# Patient Record
Sex: MALE | Race: WHITE | NOT HISPANIC OR LATINO | Employment: UNEMPLOYED | URBAN - METROPOLITAN AREA
[De-identification: names, ages, dates, MRNs, and addresses within clinical notes are randomized per-mention and may not be internally consistent; named-entity substitution may affect disease eponyms.]

---

## 2017-06-19 ENCOUNTER — TRANSCRIBE ORDERS (OUTPATIENT)
Dept: ADMINISTRATIVE | Facility: HOSPITAL | Age: 52
End: 2017-06-19

## 2017-06-19 DIAGNOSIS — M79.672 LEFT FOOT PAIN: Primary | ICD-10-CM

## 2017-07-05 ENCOUNTER — HOSPITAL ENCOUNTER (OUTPATIENT)
Dept: CT IMAGING | Facility: HOSPITAL | Age: 52
Discharge: HOME | End: 2017-07-05
Attending: ORTHOPAEDIC SURGERY | Admitting: ORTHOPAEDIC SURGERY

## 2017-07-05 DIAGNOSIS — M79.672 LEFT FOOT PAIN: ICD-10-CM

## 2017-07-05 PROCEDURE — 73700 CT LOWER EXTREMITY W/O DYE: CPT

## 2019-01-23 ENCOUNTER — HOSPITAL ENCOUNTER (OUTPATIENT)
Dept: OTHER | Facility: HOSPITAL | Age: 54
Setting detail: RECURRING SERIES
Discharge: HOME | End: 2019-04-30
Attending: FAMILY MEDICINE | Admitting: FAMILY MEDICINE

## 2019-12-09 ENCOUNTER — TREATMENT (OUTPATIENT)
Dept: PHYSICAL THERAPY | Facility: CLINIC | Age: 54
End: 2019-12-09

## 2019-12-09 ENCOUNTER — TRANSCRIBE ORDERS (OUTPATIENT)
Dept: PHYSICAL THERAPY | Facility: CLINIC | Age: 54
End: 2019-12-09

## 2019-12-09 DIAGNOSIS — M54.2 PAIN, NECK: Primary | ICD-10-CM

## 2019-12-09 DIAGNOSIS — M54.2 CERVICALGIA: Primary | ICD-10-CM

## 2019-12-09 PROCEDURE — 97162 PT EVAL MOD COMPLEX 30 MIN: CPT | Performed by: PHYSICAL THERAPIST

## 2019-12-09 NOTE — PROGRESS NOTES
"Physical Therapy Initial Evaluation and Plan of Care    Patient: Sam Johnson   : 1965  Diagnosis/ICD-10 Code:  Pain, neck [M54.2]  Referring practitioner: Gail Louis  Date of Initial Visit: 2019  Today's Date: 2019  Patient seen for 1 sessions           Subjective Questionnaire: NDI: 17       Subjective Evaluation    History of Present Illness  Mechanism of injury: Pnt reports pain in the left aspect of his neck x's 3-4 months.  He states the pain is slightly worse regarding intensity and frequency.  He has not any test for the neck.  PT referral from PCP.  He is not taking any different meds for the neck.  He denies any visual, headaches or auditory changes due to the pain.  He does reports some episodes of dizziness over the last month; \"think it's my sugar level\".    No change with time of the day.  Pain increases with quick neck motions, certain certain neck motions and yue when trying to drive.  Denies any UE N/T or pain.    SLEEP:  BSB, no change of pain.  PAST MEDICAL HISTORY:  DM, cateracts,    1ST AM:  stiff    Quality of life: good    Pain  Current pain ratin  At best pain rating: 3  At worst pain ratin  Quality: tight, throbbing and pressure  Alleviating factors: sit still and try not to move the neck.  Aggravating factors: lifting, movement and overhead activity  Progression: worsening    Hand dominance: right    Treatments  Previous treatment: medication  Patient Goals  Patient goal: less pain, turn neck and be comfortable.            Objective       Palpation   Left   Tenderness of the levator scapulae, suboccipitals and upper trapezius.   Trigger point to upper trapezius.     Right Tenderness of the suboccipitals and upper trapezius.     Active Range of Motion   Cervical/Thoracic Spine   Cervical    Flexion: 31 degrees   Extension: 40 degrees with pain  Left lateral flexion: 23 degrees with pain  Right lateral flexion: 30 degrees with pain  Left rotation: 67 " degrees with pain  Right rotation: 54 degrees with pain    Additional Active Range of Motion Details  Greater pain with ext vs flexion.  Ipsilateral pain with sidebending and rotation     Passive Range of Motion     Additional Passive Range of Motion Details  terrell PIVM C3-C71-  T1-T10 PA = 1-     Strength/Myotome Testing   Cervical Spine     Left   Normal strength    Right   Normal strength    Left Wrist/Hand      (2nd hand position)     Trial 1: 81 lbs    Thumb Strength  Key/Lateral Pinch     Trial 1: 22 lbs  Tip/Two-Point Pinch     Trial 1: 18 lbs  Palmar/Three-Point Pinch     Trial 1: 20 lbs    Right Wrist/Hand      (2nd hand position)     Trial 1: 87 lbs    Thumb Strength   Key/Lateral Pinch     Trial 1: 23 lbs  Tip/Two-Point Pinch     Trial 1: 20 lbs  Palmar/Three-Point Pinch     Trial 1: 22 lbs    Tests   Cervical     Left   Positive active compression ([place]) and Spurling's sign.   Negative Adson maneuver, alar ligament integrity, ULTT1, ULTT2, ULTT3 and ULTT4.          Assessment & Plan     Assessment  Impairments: abnormal or restricted ROM, activity intolerance, lacks appropriate home exercise program and pain with function  Assessment details: Mr. Johnson is a 54 yr old male referred to PT due to ongoing cervical pain.  He was seen in the clinic today due for the initial evaluation.  He presents with evidence of DDD cervical spine with derangement and dysfunction components.  Melquiades would benefit from outpatient PT in order to achieve the stated goals and maximize functional mobility.  The evaluation was of moderate level due to medical history, components assessed and complexity of decision making.   Functional Limitations: carrying objects, lifting and uncomfortable because of pain  Goals  Plan Goals: ST visits      1)  Pain levels 2-6/10      2)  pnt to report a 25% improvement in sleep due to cervical involvement      3)  pnt to demonstrate neutral spine posture when at rest.      4)  pnt  to demonstrate I in current HEP    LTG:   DC      1)  0-3/10      2)  Improvement of cervical motion WNLs and painfree      3)  2-/3 PIVM cervical spine      4)  IHEP      5)  Improve NDI score to < 15% deficit    Plan  Therapy options: will be seen for skilled physical therapy services  Planned modality interventions: thermotherapy (hydrocollator packs), high voltage pulsed current (pain management), cryotherapy and traction  Planned therapy interventions: therapeutic activities, strengthening, stretching, spinal/joint mobilization, manual therapy, home exercise program, body mechanics training and postural training  Frequency: 2x week  Duration in visits: 20  Duration in weeks: 8  Treatment plan discussed with: patient  Plan details: Prone strengthening,  DNF isometrics, anterior trunk stretches, manual         Timed:         Manual Therapy:    5     mins  55585;     Therapeutic Exercise:    6     mins  64677;     Neuromuscular Sabiha:        mins  96904;    Therapeutic Activity:          mins  54742;     Gait Training:           mins  86420;     Ultrasound:          mins  93033;    Ionto                                   mins   08799  Self Care                            mins   63407  Canalith Repos         mins 30271      Un-Timed:  Electrical Stimulation:         mins  52409 ( );  Dry Needling          mins self-pay  Traction          mins 49315  Low Eval          Mins  62285  Mod Eval     31     Mins  82652  High Eval                            Mins  20532  Re-Eval                               mins  60317        Timed Treatment:   11   mins   Total Treatment:     46   mins    PT SIGNATURE: Norma Alvarez, ROHAN   DATE TREATMENT INITIATED: 12/9/2019    Initial Certification  Certification Period: 3/8/2020  I certify that the therapy services are furnished while this patient is under my care.  The services outlined above are required by this patient, and will be reviewed every 90 days.     PHYSICIAN:  Gail Louis      DATE:     Please sign and return via fax to  .. Thank you, [facility].

## 2019-12-19 ENCOUNTER — TREATMENT (OUTPATIENT)
Dept: PHYSICAL THERAPY | Facility: CLINIC | Age: 54
End: 2019-12-19

## 2019-12-19 DIAGNOSIS — M54.2 PAIN, NECK: Primary | ICD-10-CM

## 2019-12-19 PROCEDURE — 97140 MANUAL THERAPY 1/> REGIONS: CPT | Performed by: PHYSICAL THERAPIST

## 2019-12-19 PROCEDURE — 97110 THERAPEUTIC EXERCISES: CPT | Performed by: PHYSICAL THERAPIST

## 2019-12-19 PROCEDURE — G0283 ELEC STIM OTHER THAN WOUND: HCPCS | Performed by: PHYSICAL THERAPIST

## 2019-12-19 NOTE — PROGRESS NOTES
Physical Therapy Daily Progress Note    VISIT#: 2    Subjective   Sam Johnson reports that the neck is still really stiff.    Objective   LSB with ipsilateral pain. LUE hand numbness during L UT stretch.  Added nerve glides  See Exercise, Manual, and Modality Logs for complete treatment.     Patient Education:    Assessment & Plan     Assessment  Assessment details: Good tolerance to the ex and work completed today.  Less pain at end of session.    Goals  Plan Goals: Plan Goals: ST visits      1)  Pain levels 2-6/10      2)  pnt to report a 25% improvement in sleep due to cervical involvement      3)  pnt to demonstrate neutral spine posture when at rest.      4)  pnt to demonstrate I in current HEP    LTG:   DC      1)  0-3/10      2)  Improvement of cervical motion WNLs and painfree      3)  2-/3 PIVM cervical spine      4)  IHEP      5)  Improve NDI score to < 15% deficit    Plan  Plan details: Consider mechanical traction if manual continues to relieve pain.           Progress per Plan of Care            Timed:         Manual Therapy:    13     mins  00104;     Therapeutic Exercise:    17     mins  26929;     Neuromuscular Sabiha:        mins  16597;    Therapeutic Activity:          mins  23353;     Gait Training:           mins  48676;     Ultrasound:          mins  53906;    Ionto                                   mins   33928  Self Care                      3      mins   09861  Canalith Repos                   mins  30227    Un-Timed:  Electrical Stimulation:    15     mins  47149 ( );  Dry Needling          mins self-pay  Traction          mins 65125  Low Eval          Mins  35137  Mod Eval          Mins  45163  High Eval                            Mins  83901  Re-Eval                               mins  46426    Timed Treatment:   33   mins   Total Treatment:     52   mins    Norma Alvarez, PT    Physical Therapist

## 2020-09-15 ENCOUNTER — DOCUMENTATION (OUTPATIENT)
Dept: PHYSICAL THERAPY | Facility: CLINIC | Age: 55
End: 2020-09-15
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
## 2020-10-16 ENCOUNTER — HOSPITAL ENCOUNTER (OUTPATIENT)
Facility: HOSPITAL | Age: 55
Setting detail: OBSERVATION
Discharge: HOME | End: 2020-10-17
Attending: EMERGENCY MEDICINE | Admitting: INTERNAL MEDICINE

## 2020-10-16 ENCOUNTER — APPOINTMENT (OUTPATIENT)
Dept: CT IMAGING | Facility: HOSPITAL | Age: 55
End: 2020-10-16

## 2020-10-16 DIAGNOSIS — R10.9 LEFT FLANK PAIN: Primary | ICD-10-CM

## 2020-10-16 DIAGNOSIS — I10 HYPERTENSION, UNSPECIFIED TYPE: ICD-10-CM

## 2020-10-16 PROBLEM — M31.6 LARGE VESSEL VASCULITIS (HCC): Status: ACTIVE | Noted: 2020-10-16

## 2020-10-16 LAB
ALBUMIN SERPL-MCNC: 3.9 G/DL (ref 3.5–5.2)
ALBUMIN/GLOB SERPL: 1.3 G/DL
ALP SERPL-CCNC: 109 U/L (ref 39–117)
ALT SERPL W P-5'-P-CCNC: 9 U/L (ref 1–41)
ANION GAP SERPL CALCULATED.3IONS-SCNC: 10 MMOL/L (ref 5–15)
APTT PPP: 26.5 SECONDS (ref 24–31)
AST SERPL-CCNC: 12 U/L (ref 1–40)
BASOPHILS # BLD AUTO: 0.1 10*3/MM3 (ref 0–0.2)
BASOPHILS NFR BLD AUTO: 1.1 % (ref 0–1.5)
BILIRUB SERPL-MCNC: 0.5 MG/DL (ref 0–1.2)
BILIRUB UR QL STRIP: NEGATIVE
BUN SERPL-MCNC: 17 MG/DL (ref 6–20)
BUN SERPL-MCNC: ABNORMAL MG/DL
BUN/CREAT SERPL: ABNORMAL
CALCIUM SPEC-SCNC: 9.3 MG/DL (ref 8.6–10.5)
CHLORIDE SERPL-SCNC: 100 MMOL/L (ref 98–107)
CLARITY UR: CLEAR
CO2 SERPL-SCNC: 26 MMOL/L (ref 22–29)
COLOR UR: YELLOW
CREAT SERPL-MCNC: 0.97 MG/DL (ref 0.76–1.27)
CRP SERPL-MCNC: 0.26 MG/DL (ref 0–0.5)
DEPRECATED RDW RBC AUTO: 42.9 FL (ref 37–54)
EOSINOPHIL # BLD AUTO: 0.2 10*3/MM3 (ref 0–0.4)
EOSINOPHIL NFR BLD AUTO: 2.8 % (ref 0.3–6.2)
ERYTHROCYTE [DISTWIDTH] IN BLOOD BY AUTOMATED COUNT: 13.8 % (ref 12.3–15.4)
ERYTHROCYTE [SEDIMENTATION RATE] IN BLOOD: 21 MM/HR (ref 0–20)
GFR SERPL CREATININE-BSD FRML MDRD: 80 ML/MIN/1.73
GLOBULIN UR ELPH-MCNC: 3.1 GM/DL
GLUCOSE BLDC GLUCOMTR-MCNC: 186 MG/DL (ref 70–105)
GLUCOSE SERPL-MCNC: 138 MG/DL (ref 65–99)
GLUCOSE UR STRIP-MCNC: ABNORMAL MG/DL
HCT VFR BLD AUTO: 43.2 % (ref 37.5–51)
HGB BLD-MCNC: 15 G/DL (ref 13–17.7)
HGB UR QL STRIP.AUTO: NEGATIVE
INR PPP: 0.93 (ref 0.93–1.1)
KETONES UR QL STRIP: NEGATIVE
LEUKOCYTE ESTERASE UR QL STRIP.AUTO: NEGATIVE
LYMPHOCYTES # BLD AUTO: 2.7 10*3/MM3 (ref 0.7–3.1)
LYMPHOCYTES NFR BLD AUTO: 32.1 % (ref 19.6–45.3)
MCH RBC QN AUTO: 30.6 PG (ref 26.6–33)
MCHC RBC AUTO-ENTMCNC: 34.7 G/DL (ref 31.5–35.7)
MCV RBC AUTO: 88.3 FL (ref 79–97)
MONOCYTES # BLD AUTO: 0.6 10*3/MM3 (ref 0.1–0.9)
MONOCYTES NFR BLD AUTO: 6.7 % (ref 5–12)
NEUTROPHILS NFR BLD AUTO: 4.9 10*3/MM3 (ref 1.7–7)
NEUTROPHILS NFR BLD AUTO: 57.3 % (ref 42.7–76)
NITRITE UR QL STRIP: NEGATIVE
NRBC BLD AUTO-RTO: 0.1 /100 WBC (ref 0–0.2)
PH UR STRIP.AUTO: 6 [PH] (ref 5–8)
PLATELET # BLD AUTO: 241 10*3/MM3 (ref 140–450)
PMV BLD AUTO: 7.1 FL (ref 6–12)
POTASSIUM SERPL-SCNC: 4.6 MMOL/L (ref 3.5–5.2)
PROT SERPL-MCNC: 7 G/DL (ref 6–8.5)
PROT UR QL STRIP: NEGATIVE
PROTHROMBIN TIME: 10.3 SECONDS (ref 9.6–11.7)
PSA SERPL-MCNC: 1.25 NG/ML (ref 0–4)
RBC # BLD AUTO: 4.89 10*6/MM3 (ref 4.14–5.8)
SODIUM SERPL-SCNC: 136 MMOL/L (ref 136–145)
SP GR UR STRIP: 1.02 (ref 1–1.03)
UROBILINOGEN UR QL STRIP: ABNORMAL
WBC # BLD AUTO: 8.5 10*3/MM3 (ref 3.4–10.8)

## 2020-10-16 PROCEDURE — 25010000002 MORPHINE PER 10 MG: Performed by: NURSE PRACTITIONER

## 2020-10-16 PROCEDURE — 96375 TX/PRO/DX INJ NEW DRUG ADDON: CPT

## 2020-10-16 PROCEDURE — 85730 THROMBOPLASTIN TIME PARTIAL: CPT | Performed by: EMERGENCY MEDICINE

## 2020-10-16 PROCEDURE — 96374 THER/PROPH/DIAG INJ IV PUSH: CPT

## 2020-10-16 PROCEDURE — 63710000001 PREDNISONE PER 5 MG: Performed by: NURSE PRACTITIONER

## 2020-10-16 PROCEDURE — 83520 IMMUNOASSAY QUANT NOS NONAB: CPT | Performed by: SURGERY

## 2020-10-16 PROCEDURE — 86140 C-REACTIVE PROTEIN: CPT | Performed by: EMERGENCY MEDICINE

## 2020-10-16 PROCEDURE — 86256 FLUORESCENT ANTIBODY TITER: CPT | Performed by: SURGERY

## 2020-10-16 PROCEDURE — G0378 HOSPITAL OBSERVATION PER HR: HCPCS

## 2020-10-16 PROCEDURE — 84153 ASSAY OF PSA TOTAL: CPT | Performed by: INTERNAL MEDICINE

## 2020-10-16 PROCEDURE — 82962 GLUCOSE BLOOD TEST: CPT

## 2020-10-16 PROCEDURE — 93010 ELECTROCARDIOGRAM REPORT: CPT | Performed by: INTERNAL MEDICINE

## 2020-10-16 PROCEDURE — 99284 EMERGENCY DEPT VISIT MOD MDM: CPT

## 2020-10-16 PROCEDURE — 99220 PR INITIAL OBSERVATION CARE/DAY 70 MINUTES: CPT | Performed by: INTERNAL MEDICINE

## 2020-10-16 PROCEDURE — 85652 RBC SED RATE AUTOMATED: CPT | Performed by: EMERGENCY MEDICINE

## 2020-10-16 PROCEDURE — 81003 URINALYSIS AUTO W/O SCOPE: CPT | Performed by: EMERGENCY MEDICINE

## 2020-10-16 PROCEDURE — 25010000002 ONDANSETRON PER 1 MG: Performed by: EMERGENCY MEDICINE

## 2020-10-16 PROCEDURE — 85610 PROTHROMBIN TIME: CPT | Performed by: EMERGENCY MEDICINE

## 2020-10-16 PROCEDURE — 80053 COMPREHEN METABOLIC PANEL: CPT | Performed by: EMERGENCY MEDICINE

## 2020-10-16 PROCEDURE — 25010000002 MORPHINE PER 10 MG: Performed by: EMERGENCY MEDICINE

## 2020-10-16 PROCEDURE — 93005 ELECTROCARDIOGRAM TRACING: CPT | Performed by: NURSE PRACTITIONER

## 2020-10-16 PROCEDURE — 0 IOPAMIDOL PER 1 ML: Performed by: EMERGENCY MEDICINE

## 2020-10-16 PROCEDURE — 63710000001 PREDNISONE PER 1 MG: Performed by: NURSE PRACTITIONER

## 2020-10-16 PROCEDURE — 85025 COMPLETE CBC W/AUTO DIFF WBC: CPT | Performed by: EMERGENCY MEDICINE

## 2020-10-16 PROCEDURE — 96376 TX/PRO/DX INJ SAME DRUG ADON: CPT

## 2020-10-16 PROCEDURE — 74177 CT ABD & PELVIS W/CONTRAST: CPT

## 2020-10-16 RX ORDER — LISINOPRIL 5 MG/1
5 TABLET ORAL DAILY
COMMUNITY

## 2020-10-16 RX ORDER — ATORVASTATIN CALCIUM 20 MG/1
20 TABLET, FILM COATED ORAL DAILY
Status: DISCONTINUED | OUTPATIENT
Start: 2020-10-16 | End: 2020-10-17 | Stop reason: HOSPADM

## 2020-10-16 RX ORDER — DOCUSATE SODIUM 100 MG/1
100 CAPSULE, LIQUID FILLED ORAL 2 TIMES DAILY PRN
Status: DISCONTINUED | OUTPATIENT
Start: 2020-10-16 | End: 2020-10-17 | Stop reason: HOSPADM

## 2020-10-16 RX ORDER — AZATHIOPRINE 50 MG/1
100 TABLET ORAL DAILY
Status: DISCONTINUED | OUTPATIENT
Start: 2020-10-16 | End: 2020-10-16

## 2020-10-16 RX ORDER — HYDROCODONE BITARTRATE AND ACETAMINOPHEN 10; 325 MG/1; MG/1
1 TABLET ORAL EVERY 8 HOURS PRN
COMMUNITY

## 2020-10-16 RX ORDER — MORPHINE SULFATE 4 MG/ML
4 INJECTION, SOLUTION INTRAMUSCULAR; INTRAVENOUS ONCE
Status: COMPLETED | OUTPATIENT
Start: 2020-10-16 | End: 2020-10-16

## 2020-10-16 RX ORDER — MORPHINE SULFATE 4 MG/ML
2 INJECTION, SOLUTION INTRAMUSCULAR; INTRAVENOUS EVERY 4 HOURS PRN
Status: DISCONTINUED | OUTPATIENT
Start: 2020-10-16 | End: 2020-10-17 | Stop reason: HOSPADM

## 2020-10-16 RX ORDER — ATORVASTATIN CALCIUM 20 MG/1
20 TABLET, FILM COATED ORAL DAILY
COMMUNITY

## 2020-10-16 RX ORDER — LISINOPRIL 5 MG/1
5 TABLET ORAL ONCE
Status: COMPLETED | OUTPATIENT
Start: 2020-10-16 | End: 2020-10-16

## 2020-10-16 RX ORDER — ONDANSETRON 2 MG/ML
4 INJECTION INTRAMUSCULAR; INTRAVENOUS EVERY 6 HOURS PRN
Status: DISCONTINUED | OUTPATIENT
Start: 2020-10-16 | End: 2020-10-17 | Stop reason: HOSPADM

## 2020-10-16 RX ORDER — ACETAMINOPHEN 325 MG/1
650 TABLET ORAL EVERY 4 HOURS PRN
Status: DISCONTINUED | OUTPATIENT
Start: 2020-10-16 | End: 2020-10-17 | Stop reason: HOSPADM

## 2020-10-16 RX ORDER — AMOXICILLIN 250 MG
1 CAPSULE ORAL 2 TIMES DAILY
Status: DISCONTINUED | OUTPATIENT
Start: 2020-10-16 | End: 2020-10-17 | Stop reason: HOSPADM

## 2020-10-16 RX ORDER — BISACODYL 5 MG/1
5 TABLET, DELAYED RELEASE ORAL DAILY PRN
Status: DISCONTINUED | OUTPATIENT
Start: 2020-10-16 | End: 2020-10-17 | Stop reason: HOSPADM

## 2020-10-16 RX ORDER — CHOLECALCIFEROL (VITAMIN D3) 125 MCG
5 CAPSULE ORAL NIGHTLY PRN
Status: DISCONTINUED | OUTPATIENT
Start: 2020-10-16 | End: 2020-10-17 | Stop reason: HOSPADM

## 2020-10-16 RX ORDER — HYDRALAZINE HYDROCHLORIDE 20 MG/ML
10 INJECTION INTRAMUSCULAR; INTRAVENOUS EVERY 6 HOURS PRN
Status: DISCONTINUED | OUTPATIENT
Start: 2020-10-16 | End: 2020-10-17 | Stop reason: HOSPADM

## 2020-10-16 RX ORDER — POLYETHYLENE GLYCOL 3350 17 G/17G
17 POWDER, FOR SOLUTION ORAL DAILY
Status: DISCONTINUED | OUTPATIENT
Start: 2020-10-16 | End: 2020-10-17 | Stop reason: HOSPADM

## 2020-10-16 RX ORDER — HYDROCODONE BITARTRATE AND ACETAMINOPHEN 10; 325 MG/1; MG/1
1 TABLET ORAL EVERY 8 HOURS PRN
Status: DISCONTINUED | OUTPATIENT
Start: 2020-10-16 | End: 2020-10-17 | Stop reason: HOSPADM

## 2020-10-16 RX ORDER — SODIUM CHLORIDE 0.9 % (FLUSH) 0.9 %
10 SYRINGE (ML) INJECTION AS NEEDED
Status: DISCONTINUED | OUTPATIENT
Start: 2020-10-16 | End: 2020-10-17 | Stop reason: HOSPADM

## 2020-10-16 RX ORDER — ACETAMINOPHEN 650 MG/1
650 SUPPOSITORY RECTAL EVERY 4 HOURS PRN
Status: DISCONTINUED | OUTPATIENT
Start: 2020-10-16 | End: 2020-10-17 | Stop reason: HOSPADM

## 2020-10-16 RX ORDER — LISINOPRIL 5 MG/1
5 TABLET ORAL DAILY
Status: DISCONTINUED | OUTPATIENT
Start: 2020-10-17 | End: 2020-10-17 | Stop reason: HOSPADM

## 2020-10-16 RX ORDER — GLIPIZIDE 5 MG/1
5 TABLET ORAL
Status: DISCONTINUED | OUTPATIENT
Start: 2020-10-16 | End: 2020-10-17 | Stop reason: HOSPADM

## 2020-10-16 RX ORDER — GLIPIZIDE 10 MG/1
10 TABLET, FILM COATED, EXTENDED RELEASE ORAL DAILY
COMMUNITY

## 2020-10-16 RX ORDER — NITROGLYCERIN 0.4 MG/1
0.4 TABLET SUBLINGUAL
Status: DISCONTINUED | OUTPATIENT
Start: 2020-10-16 | End: 2020-10-17 | Stop reason: HOSPADM

## 2020-10-16 RX ORDER — ACETAMINOPHEN 160 MG/5ML
650 SOLUTION ORAL EVERY 4 HOURS PRN
Status: DISCONTINUED | OUTPATIENT
Start: 2020-10-16 | End: 2020-10-17 | Stop reason: HOSPADM

## 2020-10-16 RX ORDER — SODIUM CHLORIDE 0.9 % (FLUSH) 0.9 %
10 SYRINGE (ML) INJECTION EVERY 12 HOURS SCHEDULED
Status: DISCONTINUED | OUTPATIENT
Start: 2020-10-16 | End: 2020-10-17 | Stop reason: HOSPADM

## 2020-10-16 RX ORDER — ONDANSETRON 2 MG/ML
4 INJECTION INTRAMUSCULAR; INTRAVENOUS ONCE
Status: COMPLETED | OUTPATIENT
Start: 2020-10-16 | End: 2020-10-16

## 2020-10-16 RX ORDER — ONDANSETRON 4 MG/1
4 TABLET, FILM COATED ORAL EVERY 6 HOURS PRN
Status: DISCONTINUED | OUTPATIENT
Start: 2020-10-16 | End: 2020-10-17 | Stop reason: HOSPADM

## 2020-10-16 RX ADMIN — SENNOSIDES AND DOCUSATE SODIUM 1 TABLET: 8.6; 5 TABLET ORAL at 22:41

## 2020-10-16 RX ADMIN — LISINOPRIL 5 MG: 5 TABLET ORAL at 14:42

## 2020-10-16 RX ADMIN — Medication 10 ML: at 11:19

## 2020-10-16 RX ADMIN — Medication 10 ML: at 22:41

## 2020-10-16 RX ADMIN — GLIPIZIDE 5 MG: 5 TABLET ORAL at 17:12

## 2020-10-16 RX ADMIN — ONDANSETRON 4 MG: 2 INJECTION INTRAMUSCULAR; INTRAVENOUS at 03:56

## 2020-10-16 RX ADMIN — SENNOSIDES AND DOCUSATE SODIUM 1 TABLET: 8.6; 5 TABLET ORAL at 14:49

## 2020-10-16 RX ADMIN — MORPHINE SULFATE 4 MG: 4 INJECTION INTRAVENOUS at 03:56

## 2020-10-16 RX ADMIN — IOPAMIDOL 100 ML: 755 INJECTION, SOLUTION INTRAVENOUS at 05:03

## 2020-10-16 RX ADMIN — MORPHINE SULFATE 2 MG: 4 INJECTION INTRAVENOUS at 11:43

## 2020-10-16 RX ADMIN — PREDNISONE 80 MG: 10 TABLET ORAL at 09:30

## 2020-10-16 RX ADMIN — POLYETHYLENE GLYCOL 3350 17 G: 17 POWDER, FOR SOLUTION ORAL at 14:41

## 2020-10-16 RX ADMIN — BISACODYL 5 MG: 5 TABLET, COATED ORAL at 22:41

## 2020-10-16 RX ADMIN — DOCUSATE SODIUM 100 MG: 100 CAPSULE, LIQUID FILLED ORAL at 17:12

## 2020-10-16 RX ADMIN — ATORVASTATIN CALCIUM 20 MG: 20 TABLET, FILM COATED ORAL at 22:40

## 2020-10-16 NOTE — CONSULTS
Name: Sam Johnson ADMIT: 10/16/2020   : 1965  PCP: Ferdinand Teran MD    MRN: [id number] LOS: 0 days   AGE/SEX: 55 y.o. male  ROOM:    [facility]      Patient Care Team:  Ferdinand Teran MD as PCP - General  Ferdinand Teran MD as PCP - Family Medicine  Gail Louis APRN as Referring Physician (Nurse Practitioner)  Chief Complaint   Patient presents with   • Back Pain     CC:left flank pain     Subjective     Consults  History of Present Illness   Harjinder Crabtree is a 55-year-old male who presented to the emergency room with left flank pain x5 days which he states is related to constipation.  Patient underwent CT abdomen pelvis evaluation which demonstrated circumferential thickening of the aortic wall, extending into the common iliac arteries.  We were asked to evaluate this patient for vasculitis, aortitis.      Patient denies any past medical history of autoimmune disease, DVT or peripheral vascular interventions.  He denies having abdominal pain, nausea, vomiting, food fear or any weight loss. He is a current pack per day smoker x4 years.  Past medical history includes diabetes type 2 and hypertension.    Review of Systems   Constitutional: Positive for fatigue.   HENT: Negative.    Eyes: Negative.    Respiratory: Negative for shortness of breath.    Cardiovascular: Negative for chest pain.   Gastrointestinal: Positive for abdominal pain.        Left lower quadrant pain   Musculoskeletal: Negative for back pain.        Left flank pain     Skin: Negative for wound.   Neurological: Negative.    Psychiatric/Behavioral: Negative.    All other systems reviewed and are negative.      Past Medical History:   Diagnosis Date   • Diabetes (CMS/HCC)    • Hypertension      History reviewed. No pertinent surgical history.  Family History   Problem Relation Age of Onset   • No Known Problems Mother    • No Known Problems Father      Social History     Tobacco Use   • Smoking status: Current  Every Day Smoker     Packs/day: 1.50     Types: Cigarettes   Substance Use Topics   • Alcohol use: Defer   • Drug use: Defer     (Not in a hospital admission)    atorvastatin, 20 mg, Oral, Daily  azaTHIOprine, 100 mg, Oral, Daily  glipizide, 5 mg, Oral, BID AC  lisinopril, 5 mg, Oral, Daily  lisinopril, 5 mg, Oral, Once  polyethylene glycol, 17 g, Oral, Daily  predniSONE, 80 mg, Oral, Daily With Breakfast  senna-docusate sodium, 1 tablet, Oral, BID  sodium chloride, 10 mL, Intravenous, Q12H         •  acetaminophen **OR** acetaminophen **OR** acetaminophen  •  bisacodyl  •  docusate sodium  •  hydrALAZINE  •  HYDROcodone-acetaminophen  •  melatonin  •  Morphine  •  nitroglycerin  •  ondansetron **OR** ondansetron  •  sodium chloride  Patient has no known allergies.    Objective     Physical Exam:  Physical Exam  Constitutional:       Appearance: He is well-developed.      Comments: I have reviewed the vital signs listed in this exam.   Eyes:      Conjunctiva/sclera: Conjunctivae normal.   Cardiovascular:      Rate and Rhythm: Normal rate and regular rhythm.      Pulses:           Carotid pulses are 2+ on the right side and 2+ on the left side.       Radial pulses are 2+ on the right side and 2+ on the left side.        Femoral pulses are 2+ on the right side and 2+ on the left side.       Popliteal pulses are 2+ on the right side and 2+ on the left side.        Dorsalis pedis pulses are 2+ on the right side and 2+ on the left side.        Posterior tibial pulses are 2+ on the right side and 2+ on the left side.      Heart sounds: Normal heart sounds.      Comments: PMI normal  No Abdominal aortic aneurysm is palpable.   Pulmonary:      Effort: Pulmonary effort is normal. No respiratory distress.      Breath sounds: Normal breath sounds.   Abdominal:      General: There is no distension.      Palpations: Abdomen is soft.      Tenderness: There is no abdominal tenderness. There is no guarding.      Comments: No  "hepatosplenomegaly is palpable.      Musculoskeletal:         General: No deformity.      Comments: Normal muscular tone of the four extremities without flaccidity, atrophy, or abnormal movements.     Inspection of the digits and nails is negative for clubbing, cyanosis, infection, or other pathology.    Skin:     General: Skin is warm and dry.      Findings: No rash.   Neurological:      Mental Status: He is alert and oriented to person, place, and time.           Vital Signs and Labs:  Vital Signs Patient Vitals for the past 24 hrs:   BP Temp Temp src Pulse Resp SpO2 Height Weight   10/16/20 1059 (!) 181/92 -- -- -- -- 94 % -- --   10/16/20 0950 169/84 -- -- -- -- -- -- --   10/16/20 0935 166/68 -- -- -- -- -- -- --   10/16/20 0634 164/77 -- -- 65 16 97 % -- --   10/16/20 0548 174/89 -- -- -- -- -- -- --   10/16/20 0233 (!) 224/100 97.5 °F (36.4 °C) Oral 68 18 99 % 180.3 cm (71\") 98.7 kg (217 lb 9.5 oz)     I/O:  No intake/output data recorded.  BMI:  Body mass index is 30.35 kg/m².    CBC    Results from last 7 days   Lab Units 10/16/20  0355   WBC 10*3/mm3 8.50   HEMOGLOBIN g/dL 15.0   PLATELETS 10*3/mm3 241     BMP   Results from last 7 days   Lab Units 10/16/20  0355   SODIUM mmol/L 136   POTASSIUM mmol/L 4.6   CHLORIDE mmol/L 100   CO2 mmol/L 26.0   BUN  17   CREATININE mg/dL 0.97   GLUCOSE mg/dL 138*     HbA1C No results found for: HGBA1C  Infection     Radiology(recent) Ct Abdomen Pelvis With Contrast    Result Date: 10/16/2020  1.  Circumferential wall thickening in the aorta and the common iliac arteries, query large vessel vasculitis, aortitis. 2.  Evidence of mesenteric panniculitis. 3.  Query constipation. 4.  Crescentic hypodensity around the right greater than left margin of the prostate, may be related to variant hypodensity of the peripheral zone of the prostate. Correlate for prior surgery, urinary/prostate symptoms, PSA marker. Recommend comparison with prior studies. 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       [id number]    Assessment/Plan       Left flank pain    Large vessel vasculitis (CMS/HCC)    Essential hypertension      55 y.o. male We were asked to evaluate this patient for vasculitis, aortitis.    CT abdomen pelvis evaluation which demonstrated circumferential thickening of the aortic wall, extending into the common iliac arteries, adjusting large vessel vasculitis.    Sed rate 21, C-reactive protein 0.26  Patient has been treated on 80 mg po prednisone daily and azathioprine 100mg po daily.    We have added additional laboratory tests  -ANCA panel  -Cryoglobulin    We will make further recommendations when laboratory testing is complete.      Personal protective equipment used for this patient encounter:  Patient wearing surgical mask []    Provider wearing a surgical mask [x]    Gloves [x]    Eye protection []    Face Shield []    Gown []    N 95 respirator or CAPR/PAPR []       I discussed the patients findings and my recommendations with patient.    Poonam Kelly PA-C  10/16/20  12:19 EDT    Please call my office with any question: [phone]

## 2020-10-16 NOTE — ED PROVIDER NOTES
Subjective   55-year-old male with chronic lower back pain, hypertension, diabetes managed on Lortab complains of constipation with last bowel movement 5 days ago.  Pain nontraumatic, present for 3 weeks, nonradiating.  Patient admits noncompliance with his blood pressure medicine here lately.  No fever, no weakness or numbness or saddle anesthesia or incontinence.  Pain is moderate, worse with movement.          Review of Systems   Gastrointestinal: Positive for abdominal pain and constipation.   Genitourinary: Positive for flank pain.   All other systems reviewed and are negative.      No past medical history on file.    No Known Allergies    No past surgical history on file.    No family history on file.    Social History     Socioeconomic History   • Marital status:      Spouse name: Not on file   • Number of children: Not on file   • Years of education: Not on file   • Highest education level: Not on file           Objective   Physical Exam  Constitutional:       Appearance: Normal appearance.   HENT:      Head: Normocephalic and atraumatic.      Mouth/Throat:      Mouth: Mucous membranes are moist.      Pharynx: Oropharynx is clear.   Eyes:      Conjunctiva/sclera: Conjunctivae normal.      Pupils: Pupils are equal, round, and reactive to light.   Neck:      Musculoskeletal: Neck supple.   Cardiovascular:      Rate and Rhythm: Normal rate and regular rhythm.      Pulses: Normal pulses.      Heart sounds: Normal heart sounds.   Pulmonary:      Effort: Pulmonary effort is normal.      Breath sounds: Normal breath sounds.   Abdominal:      General: Bowel sounds are normal.      Palpations: Abdomen is soft.      Comments: Mild left-sided abdominal tenderness to palpation, no rebound or guarding   Musculoskeletal:      Comments: Thoracic lumbar spine nontender, pain increased with range of motion lower back.   Skin:     General: Skin is warm and dry.      Capillary Refill: Capillary refill takes less than 2  seconds.   Neurological:      Mental Status: He is alert.      Sensory: No sensory deficit.      Motor: No weakness.   Psychiatric:         Mood and Affect: Mood normal.         Behavior: Behavior normal.         Procedures           ED Course                                           MDM  Number of Diagnoses or Management Options  Hypertension, unspecified type:   Left flank pain:   Diagnosis management comments: Results for orders placed or performed during the hospital encounter of 10/16/20  -Comprehensive Metabolic Panel  Specimen: Blood       Result                      Value             Ref Range           Glucose                     138 (H)           65 - 99 mg/dL       BUN                                                               Creatinine                  0.97              0.76 - 1.27 *       Sodium                      136               136 - 145 mm*       Potassium                   4.6               3.5 - 5.2 mm*       Chloride                    100               98 - 107 mmo*       CO2                         26.0              22.0 - 29.0 *       Calcium                     9.3               8.6 - 10.5 m*       Total Protein               7.0               6.0 - 8.5 g/*       Albumin                     3.90              3.50 - 5.20 *       ALT (SGPT)                  9                 1 - 41 U/L          AST (SGOT)                  12                1 - 40 U/L          Alkaline Phosphatase        109               39 - 117 U/L        Total Bilirubin             0.5               0.0 - 1.2 mg*       eGFR Non  Amer       80                >60 mL/min/1*       Globulin                    3.1               gm/dL               A/G Ratio                   1.3               g/dL                BUN/Creatinine Ratio                                              Anion Gap                   10.0              5.0 - 15.0 m*  -Protime-INR  Specimen: Blood       Result                      Value              Ref Range           Protime                     10.3              9.6 - 11.7 S*       INR                         0.93              0.93 - 1.10    -aPTT  Specimen: Blood       Result                      Value             Ref Range           PTT                         26.5              24.0 - 31.0 *  -Urinalysis With Culture If Indicated - Urine, Clean Catch  Specimen: Urine, Clean Catch       Result                      Value             Ref Range           Color, UA                   Yellow            Yellow, Straw       Appearance, UA              Clear             Clear               pH, UA                      6.0               5.0 - 8.0           Specific Gravity, UA        1.023             1.005 - 1.030       Glucose, UA                                   Negative        100 mg/dL (Trace) (A)       Ketones, UA                 Negative          Negative            Bilirubin, UA               Negative          Negative            Blood, UA                   Negative          Negative            Protein, UA                 Negative          Negative            Leuk Esterase, UA           Negative          Negative            Nitrite, UA                 Negative          Negative            Urobilinogen, UA            1.0 E.U./dL       0.2 - 1.0 E.*  -CBC Auto Differential  Specimen: Blood       Result                      Value             Ref Range           WBC                         8.50              3.40 - 10.80*       RBC                         4.89              4.14 - 5.80 *       Hemoglobin                  15.0              13.0 - 17.7 *       Hematocrit                  43.2              37.5 - 51.0 %       MCV                         88.3              79.0 - 97.0 *       MCH                         30.6              26.6 - 33.0 *       MCHC                        34.7              31.5 - 35.7 *       RDW                         13.8              12.3 - 15.4 %       RDW-SD                      42.9               37.0 - 54.0 *       MPV                         7.1               6.0 - 12.0 fL       Platelets                   241               140 - 450 10*       Neutrophil %                57.3              42.7 - 76.0 %       Lymphocyte %                32.1              19.6 - 45.3 %       Monocyte %                  6.7               5.0 - 12.0 %        Eosinophil %                2.8               0.3 - 6.2 %         Basophil %                  1.1               0.0 - 1.5 %         Neutrophils, Absolute       4.90              1.70 - 7.00 *       Lymphocytes, Absolute       2.70              0.70 - 3.10 *       Monocytes, Absolute         0.60              0.10 - 0.90 *       Eosinophils, Absolute       0.20              0.00 - 0.40 *       Basophils, Absolute         0.10              0.00 - 0.20 *       nRBC                        0.1               0.0 - 0.2 /1*  -BUN  Specimen: Blood       Result                      Value             Ref Range           BUN                         17                6 - 20 mg/dL   -C-reactive Protein  Specimen: Blood       Result                      Value             Ref Range           C-Reactive Protein          0.26              0.00 - 0.50 *  Ct Abdomen Pelvis With Contrast    Result Date: 10/16/2020  1.  Circumferential wall thickening in the aorta and the common iliac arteries, query large vessel vasculitis, aortitis. 2.  Evidence of mesenteric panniculitis. 3.  Query constipation. 4.  Crescentic hypodensity around the right greater than left margin of the prostate, may be related to variant hypodensity of the peripheral zone of the prostate. Correlate for prior surgery, urinary/prostate symptoms, PSA marker. Recommend comparison with prior studies. Electronically signed by:  Siomara Anderson  10/16/2020 3:16 AM      Pain improved, patient appears well, denies any history of inflammatory arthritis or other inflammatory disease he is aware of.  Will check sed rate,  CRP, admit for further evaluation.       Amount and/or Complexity of Data Reviewed  Clinical lab tests: reviewed  Tests in the radiology section of CPT®: reviewed        Final diagnoses:   Left flank pain   Hypertension, unspecified type            Gil Christian MD  10/16/20 0684

## 2020-10-16 NOTE — ED NOTES
Patient reports taking narcotic pain medication regularly and has had intermittent constipation for a month. Reports left lumbar back pain tonight that he believes is associated with his constipation.     Erin Escobar, LPN  10/16/20 0349

## 2020-10-16 NOTE — H&P
"      [facility] Medicine Services      Patient Name: Sam Johnson  : 1965  MRN: [id number]  Primary Care Physician: Ferdinand Teran MD  Date of admission: 10/16/2020    Patient Care Team:  Ferdinand Teran MD as PCP - General  Ferdinand Teran MD as PCP - Family Medicine  Gail Louis APRN as Referring Physician (Nurse Practitioner)          Subjective   History Present Illness     Chief Complaint:   Chief Complaint   Patient presents with   • Back Pain                  55 year ld male presents with compliants of flank and lower back pain. He reports he has had it for months. He denies injury, paresthesia, difficulty ambulating . He reports constipation with no BM in 5 days and some difficulty starting stream of urine. He denies fever, cough, dyspnea or chest pain. He reports PMH of hypertension but has not been taking medication for awhile, He reports PMH of DM type 2 but takes no medication. Serum glucose today was 138 non fasting.      CT abdomen per radiology:  \"MPRESSION:     1.  Circumferential wall thickening in the aorta and the common iliac arteries, query large vessel vasculitis, aortitis.     2.  Evidence of mesenteric panniculitis.     3.  Query constipation.     4.  Crescentic hypodensity around the right greater than left margin of the prostate, may be related to variant hypodensity of the peripheral zone of the prostate. Correlate for prior surgery, urinary/prostate symptoms, PSA marker. Recommend comparison   with prior studies.\"  Sed rate was 21, CRP 0.26. He denies any family or personal history of autoimmune disease, , lupus or RA. He will be admitted for further evaluation and treatment . He is  1ppd smoker.      Review of Systems   Constitution: Negative.   HENT: Negative.    Eyes: Negative.    Cardiovascular: Negative.    Respiratory: Negative.    Endocrine: Negative.    Hematologic/Lymphatic: Negative.    Skin: Negative.    Musculoskeletal: " Positive for back pain.   Gastrointestinal: Negative.    Genitourinary: Positive for flank pain and hesitancy.   Neurological: Negative.    Psychiatric/Behavioral: Negative.    Allergic/Immunologic: Negative.            Personal History     Past Medical History:   Past Medical History:   Diagnosis Date   • Diabetes (CMS/HCC)    • Hypertension        Surgical History:    History reviewed. No pertinent surgical history.        Family History: family history includes No Known Problems in his father and mother. Otherwise pertinent FHx was reviewed and unremarkable.     Social History:  reports that he has been smoking cigarettes. He has been smoking about 1.50 packs per day. He does not have any smokeless tobacco history on file. Alcohol use questions deferred to the physician. Drug use questions deferred to the physician.      Medications:  Prior to Admission medications    Not on File       Allergies:  No Known Allergies    Objective   Objective     Vital Signs  Temp:  [97.5 °F (36.4 °C)] 97.5 °F (36.4 °C)  Heart Rate:  [65-68] 65  Resp:  [16-18] 16  BP: (164-224)/() 164/77  SpO2:  [97 %-99 %] 97 %  on   ;   Device (Oxygen Therapy): room air  Body mass index is 30.35 kg/m².    Physical Exam  Vitals signs reviewed.   Constitutional:       Appearance: Normal appearance. He is obese.   HENT:      Head: Normocephalic and atraumatic.      Right Ear: External ear normal.      Left Ear: External ear normal.      Nose: Nose normal.      Mouth/Throat:      Mouth: Mucous membranes are moist.   Eyes:      Extraocular Movements: Extraocular movements intact.   Neck:      Musculoskeletal: Normal range of motion and neck supple.   Cardiovascular:      Rate and Rhythm: Normal rate and regular rhythm.      Heart sounds: Normal heart sounds.   Pulmonary:      Effort: Pulmonary effort is normal.      Breath sounds: Normal breath sounds.   Abdominal:      Palpations: Abdomen is soft.   Genitourinary:     Comments:  deferred  Musculoskeletal: Normal range of motion.   Skin:     General: Skin is warm and dry.   Neurological:      General: No focal deficit present.      Mental Status: He is alert and oriented to person, place, and time.   Psychiatric:         Mood and Affect: Mood normal.         Behavior: Behavior normal.         Thought Content: Thought content normal.         Judgment: Judgment normal.         Results Review:  I have personally reviewed most recent radiology images and interpretations and agree with findings, most notably: CT abd.    Results from last 7 days   Lab Units 10/16/20  0355   WBC 10*3/mm3 8.50   HEMOGLOBIN g/dL 15.0   HEMATOCRIT % 43.2   PLATELETS 10*3/mm3 241   INR  0.93     Results from last 7 days   Lab Units 10/16/20  0355   SODIUM mmol/L 136   POTASSIUM mmol/L 4.6   CHLORIDE mmol/L 100   CO2 mmol/L 26.0   BUN  17   CREATININE mg/dL 0.97   GLUCOSE mg/dL 138*   CALCIUM mg/dL 9.3   ALT (SGPT) U/L 9   AST (SGOT) U/L 12     Estimated Creatinine Clearance: 100.7 mL/min (by C-G formula based on SCr of 0.97 mg/dL).  Brief Urine Lab Results  (Last result in the past 365 days)      Color   Clarity   Blood   Leuk Est   Nitrite   Protein   CREAT   Urine HCG        10/16/20 0444 Yellow Clear Negative Negative Negative Negative               Microbiology Results (last 10 days)     ** No results found for the last 240 hours. **          ECG/EMG Results (most recent)     None                    Ct Abdomen Pelvis With Contrast    Result Date: 10/16/2020  1.  Circumferential wall thickening in the aorta and the common iliac arteries, query large vessel vasculitis, aortitis. 2.  Evidence of mesenteric panniculitis. 3.  Query constipation. 4.  Crescentic hypodensity around the right greater than left margin of the prostate, may be related to variant hypodensity of the peripheral zone of the prostate. Correlate for prior surgery, urinary/prostate symptoms, PSA marker. Recommend comparison with prior studies.  Electronically signed by:  Siomara Anderson  10/16/2020 3:16 AM        Estimated Creatinine Clearance: 100.7 mL/min (by C-G formula based on SCr of 0.97 mg/dL).    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     Flank pain, lower back pain, CT shows normal musculoskeletal per report, consider XR spine or MRI may be secondary to below :    Large vessel vasculitis , aortitis per CT report, minimal elevation SED rate, started 80 mg po prednisone daily and azathioprine 100mg po daily, consider biopsy, MRI or vascular consult , repeat CRP and ESR in am     Essential HTN uncontrolled on admission - non compliance home meds which are unknown- hydralazine 10 mg IV SBP > 160 , monitor BP    Constipation - ducolax and colace prn     DM Type 2 reported no home meds non fasting glucose 138, LCS diet A1c in am     Possible hypodensity of prostate , with reported urinary hesitancy , consider urology consult             VTE Prophylaxis -   Mechanical Order History:      Ordered        10/16/20 0752  Place Sequential Compression Device  Once         10/16/20 0752  Maintain Sequential Compression Device  Continuous                 Pharmalogical Order History:     None          CODE STATUS:    Code Status and Medical Interventions:   Ordered at: 10/16/20 0752     Code Status:    CPR     Medical Interventions (Level of Support Prior to Arrest):    Full       This patient has been examined wearing appropriate Personal Protective Equipment . 10/16/20      I discussed the patient's findings and my recommendations with patient.        Electronically signed by MARGARET Sylvester, 10/16/20, 7:54 AM EDT.  [facility]ist Team      Attending attestation:    I performed a history and physical examination  of the patient. I reviewed the nurse practitioner's note and agree with the documented findings and plan of care.    55-year-old male with past medical history of hypertension, and diabetes as well as chronic low back pain on Lortab presents to the ED with worsening low pain and constipation.  Patient states he has had chronic low back pain for years but about a month ago started having worsening left low back pain.  Did have a fall but does not remember if he hit his back or not.  Denies any radiation to the pain.  It is pressure-like, intermittent, does wake him up at night.  This pain is different than his chronic low back pain.  Denies any urinary or fecal incontinence or any paresthesias.  Denies any dysuria, melena, hematochezia, or hematuria.  He also has been constipated for past month or so with intermittent bowel movements.  Last bowel movement was 5 days ago.  CT abdomen pelvis done in the ED showed extensive findings including aortitis and panniculitis with report noted as above.    Physical exam:  General: Awake, alert, obese, NAD  HEENT: EOMI, PERRL, mucous membranes moist, pharynx clear, no lymphadenopathy  CV: RRR, no murmurs, no gallops  Lungs: CTA bilaterally, no wheezing or rhonchi  Abdomen: Obese, soft, mild tenderness to bilateral lower quadrants, positive bowel sounds, no guarding or rebound  Extremities: Moves all extremities spontaneously, normal range of motion, no weakness  Skin: Warm and dry, no active lesions noted  Neurologic: A&O x3, CN grossly intact, moves all extremities spontaneously, sensation intact bilaterally    Assessment and plan:  Acute on chronic low back pain  Constipation  Aortitis  -CT abdomen pelvis report reviewed  -Pain could be related to the aortitis and vasculitic changes  -Continue pain control, bowel regimen added  -Inflammatory markers slightly elevated, continue to monitor  -Started on prednisone and Imuran, monitor  -Vascular surgery consult for possible  biopsy    Abnormal CT abdomen pelvis  -Multiple findings as above  -will check PSA    Hypertension  -Elevated in the ED, patient states his blood pressure is normal at home  -On lisinopril at home, continue  -Blood pressure has improved, continue to monitor  -Hydralazine as needed    DM2  -Continue home glipizide, hold Metformin  -A1c ordered  -Monitor blood glucose    DVT prophylaxis  -SCDs

## 2020-10-17 VITALS
RESPIRATION RATE: 14 BRPM | TEMPERATURE: 97.7 F | HEART RATE: 80 BPM | WEIGHT: 206.13 LBS | HEIGHT: 71 IN | DIASTOLIC BLOOD PRESSURE: 72 MMHG | BODY MASS INDEX: 28.86 KG/M2 | SYSTOLIC BLOOD PRESSURE: 137 MMHG | OXYGEN SATURATION: 98 %

## 2020-10-17 LAB — GLUCOSE BLDC GLUCOMTR-MCNC: 101 MG/DL (ref 70–105)

## 2020-10-17 PROCEDURE — 99217 PR OBSERVATION CARE DISCHARGE MANAGEMENT: CPT | Performed by: INTERNAL MEDICINE

## 2020-10-17 PROCEDURE — G0378 HOSPITAL OBSERVATION PER HR: HCPCS

## 2020-10-17 PROCEDURE — 82962 GLUCOSE BLOOD TEST: CPT

## 2020-10-17 RX ORDER — AMOXICILLIN 250 MG
1 CAPSULE ORAL 2 TIMES DAILY
Start: 2020-10-17

## 2020-10-17 RX ORDER — POLYETHYLENE GLYCOL 3350 17 G/17G
17 POWDER, FOR SOLUTION ORAL DAILY
Start: 2020-10-18

## 2020-10-17 RX ORDER — BISACODYL 10 MG
10 SUPPOSITORY, RECTAL RECTAL ONCE
Status: DISCONTINUED | OUTPATIENT
Start: 2020-10-17 | End: 2020-10-17 | Stop reason: HOSPADM

## 2020-10-17 RX ADMIN — ATORVASTATIN CALCIUM 20 MG: 20 TABLET, FILM COATED ORAL at 08:50

## 2020-10-17 RX ADMIN — LISINOPRIL 5 MG: 5 TABLET ORAL at 08:50

## 2020-10-17 RX ADMIN — Medication 10 ML: at 08:50

## 2020-10-17 RX ADMIN — GLIPIZIDE 5 MG: 5 TABLET ORAL at 08:50

## 2020-10-17 NOTE — PROGRESS NOTES
Name: Sam Johnson ADMIT: 10/16/2020   : 1965  PCP: Ferdinand Teran MD    MRN: [id number] LOS: 0 days   AGE/SEX: 55 y.o. male  ROOM: [address] [place] [address]    [place], Subsequent Hospital Care    Chief Complaint   Patient presents with   • Back Pain     CC: Constipated  Subjective     55 y.o. male admitted with vague abdominal pain.  Patient states that he had a bowel movement and will feel better.  Patient says pain is resolved and none overnight.  CT scan with mild thickening of aortic and iliac walls.  Inflammatory markers were normal however and no definite evidence of vasculitis.  Patient asymptomatic.  Patient with normal peripheral pulses.    Review of Systems   Constitutional: Negative.    Respiratory: Negative.    Cardiovascular: Negative.    Gastrointestinal: Positive for constipation. Negative for abdominal distention, abdominal pain, diarrhea and nausea.   Musculoskeletal: Negative.    Skin: Negative.        Objective     Scheduled Medications:   atorvastatin, 20 mg, Oral, Daily  glipizide, 5 mg, Oral, BID AC  lisinopril, 5 mg, Oral, Daily  polyethylene glycol, 17 g, Oral, Daily  senna-docusate sodium, 1 tablet, Oral, BID  sodium chloride, 10 mL, Intravenous, Q12H        Active Infusions:       As Needed Medications:  •  acetaminophen **OR** acetaminophen **OR** acetaminophen  •  bisacodyl  •  docusate sodium  •  hydrALAZINE  •  HYDROcodone-acetaminophen  •  melatonin  •  Morphine  •  nitroglycerin  •  ondansetron **OR** ondansetron  •  sodium chloride    Vital Signs  Vital Signs Patient Vitals for the past 24 hrs:   BP Temp Temp src Pulse Resp SpO2 Height Weight   10/17/20 0645 137/72 97.7 °F (36.5 °C) Oral 74 14 98 % -- 93.5 kg (206 lb 2.1 oz)   10/16/20 2300 118/64 98.1 °F (36.7 °C) Oral 76 12 97 % -- --   10/16/20 1821 168/88 97.8 °F (36.6 °C) Oral 74 16 96 % -- --   10/16/20 1555 178/91 -- -- -- -- -- -- --   10/16/20 1408 (!) 200/94 98.5 °F (36.9 °C) Oral 77 16 99 % 180.3 cm  "(70.98\") 95.9 kg (211 lb 6.7 oz)   10/16/20 1059 (!) 181/92 -- -- -- -- 94 % -- --   10/16/20 0950 169/84 -- -- -- -- -- -- --   10/16/20 0935 166/68 -- -- -- -- -- -- --       Physical Exam:  Physical Exam  Vitals signs reviewed.   HENT:      Head: Normocephalic.   Eyes:      Extraocular Movements: Extraocular movements intact.      Pupils: Pupils are equal, round, and reactive to light.   Cardiovascular:      Rate and Rhythm: Normal rate and regular rhythm.      Pulses: Normal pulses.   Pulmonary:      Effort: Pulmonary effort is normal.      Breath sounds: Normal breath sounds.   Abdominal:      General: Abdomen is flat. Bowel sounds are normal.      Palpations: Abdomen is soft.      Tenderness: There is no abdominal tenderness.   Musculoskeletal: Normal range of motion.         General: No swelling.   Skin:     General: Skin is warm.      Capillary Refill: Capillary refill takes less than 2 seconds.   Neurological:      General: No focal deficit present.      Mental Status: He is alert and oriented to person, place, and time.      Cranial Nerves: No cranial nerve deficit.   Psychiatric:         Mood and Affect: Mood normal.         Behavior: Behavior normal.         Thought Content: Thought content normal.         Judgment: Judgment normal.          Results Review:     CBC    Results from last 7 days   Lab Units 10/16/20  0355   WBC 10*3/mm3 8.50   HEMOGLOBIN g/dL 15.0   PLATELETS 10*3/mm3 241     BMP   Results from last 7 days   Lab Units 10/16/20  0355   SODIUM mmol/L 136   POTASSIUM mmol/L 4.6   CHLORIDE mmol/L 100   CO2 mmol/L 26.0   BUN  17   CREATININE mg/dL 0.97   GLUCOSE mg/dL 138*     Radiology(recent) Ct Abdomen Pelvis With Contrast    Result Date: 10/16/2020  1.  Circumferential wall thickening in the aorta and the common iliac arteries, query large vessel vasculitis, aortitis. 2.  Evidence of mesenteric panniculitis. 3.  Query constipation. 4.  Crescentic hypodensity around the right greater than " left margin of the prostate, may be related to variant hypodensity of the peripheral zone of the prostate. Correlate for prior surgery, urinary/prostate symptoms, PSA marker. Recommend comparison with prior studies. Electronically signed by:  Siomara Anderson  10/16/2020 3:16 AM      Assessment/Plan     Assessment & Plan      Left flank pain    Large vessel vasculitis (CMS/HCC)    Essential hypertension      55 y.o. male with CT scan finding of mild thickening of aorta and iliac vessels.  Normal inflammatory markers and pain resolved.  No definite evidence of vasculitis.  Patient states need to have bowel movement for constipation which he has episodically chronically and will feel better.  Patient still without bowel movement through the night and plans for medicine.  Plans per medicine.  We will see as needed.      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

## 2020-10-17 NOTE — PLAN OF CARE
Patient is no longer having pain, no nausea, no SOA, VSS, SR on the monitor, only c/o constipation and has had no relief yet despite meds but the patient is unable to have a BM in public bathrooms and would like to be discharged home and have a BM there, Dr. Mathews-hospitalist ok with discharge and can drive himself home  Problem: Adult Inpatient Plan of Care  Goal: Plan of Care Review  Outcome: Met  Goal: Patient-Specific Goal (Individualized)  Outcome: Met  Goal: Absence of Hospital-Acquired Illness or Injury  Outcome: Met  Intervention: Identify and Manage Fall Risk  Recent Flowsheet Documentation  Taken 10/17/2020 0916 by Talya Orellana RN  Safety Promotion/Fall Prevention: safety round/check completed  Taken 10/17/2020 0730 by Talya Orellana RN  Safety Promotion/Fall Prevention:   safety round/check completed   assistive device/personal items within reach   clutter free environment maintained   lighting adjusted   nonskid shoes/slippers when out of bed   room organization consistent  Intervention: Prevent Skin Injury  Recent Flowsheet Documentation  Taken 10/17/2020 0730 by Talya Orellana RN  Body Position:   supine   sitting up in bed   position changed independently  Goal: Optimal Comfort and Wellbeing  Outcome: Met  Intervention: Provide Person-Centered Care  Recent Flowsheet Documentation  Taken 10/17/2020 0730 by Talya Orellana RN  Trust Relationship/Rapport:   care explained   choices provided   emotional support provided   questions answered   questions encouraged   reassurance provided   thoughts/feelings acknowledged  Goal: Readiness for Transition of Care  Outcome: Met     Problem: Constipation  Goal: Effective Bowel Elimination  Outcome: Met  Intervention: Promote Effective Bowel Elimination  Recent Flowsheet Documentation  Taken 10/17/2020 0730 by Talya Orellana RN  Bowel Elimination Management: (did not want any more laxatives, would like to take at home)   toileting offered   sitting  position facilitated   relaxation techniques promoted   hygiene measures promoted   other (see comments)   Goal Outcome Evaluation:  Plan of Care Reviewed With: patient

## 2020-10-17 NOTE — PLAN OF CARE
Goal Outcome Evaluation:  Plan of Care Reviewed With: patient     Outcome Summary: Pt has left flank pain and HTN. Bowels are hypoactive and abd is very tender. Have given all PRN meds to help facilitate a bowel movement, however pt is still in a great deal of pain. Pt is awaiting [facility] to see him. Will cont. to monitor.

## 2020-10-17 NOTE — DISCHARGE SUMMARY
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           Hospital Course:  Sam Johnson is a 55 y.o. male past medical history of hypertension, and diabetes as well as chronic low back pain on Lortab presents to the ED with worsening low pain and constipation.  Patient states he has had chronic low back pain for years but about a month ago started having worsening left low back pain. CT abdomen pelvis done in the ED showed extensive findings including aortitis and panniculitis with report noted as below.  Does show constipation with aortitis concerning for large vessel vasculitis.  Vascular surgery was consulted and reviewed imaging along with radiology.  Less concern for vasculitis and no need for aggressive treatment at this time.  PSA was also ordered due to the findings regarding prostate on CT.  The next morning patient had significant improvement in his back pain and has completely resolved.  He wants to go home and follow-up with his PCP as outpatient.  He was prescribed bowel regimen for the constipation and agrees to take it.  He also verbalized understanding to follow-up with his PCP for follow-up on his lab work and imaging and he agrees.  Vascular surgery has cleared patient for discharge.  Patient is stable to discharge home today with follow-up with PCP as an outpatient.          Recommendation for Outpatient Providers:              Reasons For Change In Medications and Indications for New Medications:        Day of Discharge     HPI:     Patient seen and examined the morning of discharge.  He is doing great, wants to go home today.  His back pain is resolved.  He still feels constipated but does not want to have a bowel movement while in the hospital and wants to have it at home as he has trouble having bowel movements in public toilets.  He denies any other complaints.  Vascular surgery has cleared patient for discharge.    Vital Signs:   Temp:  [97.7 °F (36.5 °C)-98.5 °F (36.9 °C)] 97.7 °F (36.5 °C)  Heart Rate:  [74-80] 80  Resp:  [12-16] 14  BP: (118-200)/(64-94) 137/72     Physical Exam:  Physical Exam  Constitutional:       General: He is not in acute distress.     Appearance: Normal appearance. He is obese.   HENT:      Mouth/Throat:      Mouth: Mucous membranes are moist.      Pharynx: Oropharynx is clear.   Eyes:      Extraocular Movements: Extraocular movements intact.      Conjunctiva/sclera: Conjunctivae normal.      Pupils: Pupils are equal, round, and reactive to light.   Neck:      Musculoskeletal: Normal range of motion and neck supple.   Cardiovascular:      Rate and Rhythm: Normal rate and regular rhythm.      Heart sounds: Normal heart sounds. No murmur.   Pulmonary:      Effort: Pulmonary effort is normal. No respiratory distress.      Breath sounds: Normal breath sounds. No wheezing.   Abdominal:      General: Bowel sounds are normal. There is no distension.      Palpations: Abdomen is soft.      Tenderness: There is no abdominal tenderness. There is no guarding or rebound.   Musculoskeletal: Normal range of motion.   Skin:     General: Skin is warm and dry.   Neurological:      General: No focal deficit present.      Mental Status: He is alert and oriented to person, place, and time. Mental status is at baseline.      Cranial Nerves: No cranial nerve deficit.   Psychiatric:         Mood and Affect: Mood  normal.         Behavior: Behavior normal.         Judgment: Judgment normal.         Pertinent  and/or Most Recent Results     Results from last 7 days   Lab Units 10/16/20  0355   WBC 10*3/mm3 8.50   HEMOGLOBIN g/dL 15.0   HEMATOCRIT % 43.2   PLATELETS 10*3/mm3 241   SODIUM mmol/L 136   POTASSIUM mmol/L 4.6   CHLORIDE mmol/L 100   CO2 mmol/L 26.0   BUN  17   CREATININE mg/dL 0.97   GLUCOSE mg/dL 138*   CALCIUM mg/dL 9.3     Results from last 7 days   Lab Units 10/16/20  0355   BILIRUBIN mg/dL 0.5   ALK PHOS U/L 109   ALT (SGPT) U/L 9   AST (SGOT) U/L 12   PROTIME Seconds 10.3   INR  0.93   APTT seconds 26.5           Invalid input(s): TG, LDLCALC, LDLREALC        Brief Urine Lab Results  (Last result in the past 365 days)      Color   Clarity   Blood   Leuk Est   Nitrite   Protein   CREAT   Urine HCG        10/16/20 0444 Yellow Clear Negative Negative Negative Negative               Microbiology Results Abnormal     None          Ct Abdomen Pelvis With Contrast    Result Date: 10/16/2020  Impression: 1.  Circumferential wall thickening in the aorta and the common iliac arteries, query large vessel vasculitis, aortitis. 2.  Evidence of mesenteric panniculitis. 3.  Query constipation. 4.  Crescentic hypodensity around the right greater than left margin of the prostate, may be related to variant hypodensity of the peripheral zone of the prostate. Correlate for prior surgery, urinary/prostate symptoms, PSA marker. Recommend comparison with prior studies. Electronically signed by:  Siomara Anderson  10/16/2020 3:16 AM                             Test Results Pending at Discharge  Pending Labs     Order Current Status    ANCA Panel In process            Procedures Performed           Consults:   Consults     Date and Time Order Name Status Description    10/16/2020 1105 Inpatient Vascular Surgery Consult Completed     10/16/2020 0615 Inpatient Hospitalist Consult Completed             Discharge Details        Discharge  Medications      New Medications      Instructions Start Date   polyethylene glycol 17 g packet  Commonly known as: MIRALAX   17 g, Oral, Daily   Start Date: October 18, 2020     sennosides-docusate 8.6-50 MG per tablet  Commonly known as: PERICOLACE   1 tablet, Oral, 2 Times Daily         Continue These Medications      Instructions Start Date   atorvastatin 20 MG tablet  Commonly known as: LIPITOR   20 mg, Oral, Daily      glipizide 10 MG 24 hr tablet  Commonly known as: GLUCOTROL XL   10 mg, Oral, Daily      HYDROcodone-acetaminophen  MG per tablet  Commonly known as: NORCO   1 tablet, Oral, Every 8 Hours PRN      lisinopril 5 MG tablet  Commonly known as: PRINIVIL,ZESTRIL   5 mg, Oral, Daily      metFORMIN 850 MG tablet  Commonly known as: GLUCOPHAGE   850 mg, Oral, 2 Times Daily With Meals             No Known Allergies      Discharge Disposition:  Home or Self Care    Diet:  Hospital:  Diet Order   Procedures   • Diet Cardiac, Diabetic/Consistent Carbs; Healthy Heart; Diabetic - Consistent Carb         Discharge Activity:   Activity Instructions     As tolerated                 CODE STATUS:    Code Status and Medical Interventions:   Ordered at: 10/16/20 0752     Code Status:    CPR     Medical Interventions (Level of Support Prior to Arrest):    Full         Follow-up Appointments  No future appointments.          Condition on Discharge:      Stable      This patient has been examined wearing appropriate Personal Protective Equipment on 10/17/20      Electronically signed by Allan Mathews DO, 10/17/20, 11:10 AM EDT.      Time: I spent  25  minutes on this discharge activity which included face-to-face encounter with the patient/reviewing the data in the system/coordination of the care with the nursing staff as well as consultants/documentation/entering orders.

## 2020-10-19 LAB
C-ANCA TITR SER IF: NORMAL TITER
MYELOPEROXIDASE AB SER IA-ACNC: <9 U/ML (ref 0–9)
P-ANCA ATYPICAL TITR SER IF: NORMAL TITER
P-ANCA TITR SER IF: NORMAL TITER
PROTEINASE3 AB SER IA-ACNC: <3.5 U/ML (ref 0–3.5)

## 2020-10-19 NOTE — PROGRESS NOTES
Continued Stay Note  MEKA Bermudez     Patient Name: Sam Johnson  MRN: [id number]  Today's Date: 10/19/2020    Admit Date: 10/16/2020    Discharge Plan     Row Name 10/19/20 0859       Plan    Final Discharge Disposition Code  01 - home or self-care            Expected Discharge Date and Time     Expected Discharge Date Expected Discharge Time    Oct 17, 2020             Mariama Badillo RN

## 2023-05-19 ENCOUNTER — APPOINTMENT (OUTPATIENT)
Dept: CT IMAGING | Facility: HOSPITAL | Age: 58
End: 2023-05-19
Payer: MEDICAID

## 2023-05-19 ENCOUNTER — HOSPITAL ENCOUNTER (EMERGENCY)
Facility: HOSPITAL | Age: 58
Discharge: HOME | End: 2023-05-19
Attending: STUDENT IN AN ORGANIZED HEALTH CARE EDUCATION/TRAINING PROGRAM
Payer: MEDICAID

## 2023-05-19 ENCOUNTER — APPOINTMENT (OUTPATIENT)
Dept: GENERAL RADIOLOGY | Facility: HOSPITAL | Age: 58
End: 2023-05-19
Payer: MEDICAID

## 2023-05-19 VITALS
WEIGHT: 225 LBS | RESPIRATION RATE: 18 BRPM | TEMPERATURE: 97.8 F | HEART RATE: 69 BPM | HEIGHT: 71 IN | SYSTOLIC BLOOD PRESSURE: 143 MMHG | BODY MASS INDEX: 31.5 KG/M2 | OXYGEN SATURATION: 98 % | DIASTOLIC BLOOD PRESSURE: 66 MMHG

## 2023-05-19 DIAGNOSIS — M54.50 ACUTE BILATERAL LOW BACK PAIN WITHOUT SCIATICA: ICD-10-CM

## 2023-05-19 DIAGNOSIS — M79.10 MUSCLE PAIN: Primary | ICD-10-CM

## 2023-05-19 PROCEDURE — 72125 CT NECK SPINE W/O DYE: CPT

## 2023-05-19 PROCEDURE — 96372 THER/PROPH/DIAG INJ SC/IM: CPT

## 2023-05-19 PROCEDURE — 99283 EMERGENCY DEPT VISIT LOW MDM: CPT

## 2023-05-19 PROCEDURE — 72100 X-RAY EXAM L-S SPINE 2/3 VWS: CPT

## 2023-05-19 PROCEDURE — 25010000002 KETOROLAC TROMETHAMINE PER 15 MG: Performed by: STUDENT IN AN ORGANIZED HEALTH CARE EDUCATION/TRAINING PROGRAM

## 2023-05-19 RX ORDER — KETOROLAC TROMETHAMINE 15 MG/ML
15 INJECTION, SOLUTION INTRAMUSCULAR; INTRAVENOUS ONCE
Status: COMPLETED | OUTPATIENT
Start: 2023-05-19 | End: 2023-05-19

## 2023-05-19 RX ORDER — IBUPROFEN 800 MG/1
800 TABLET ORAL EVERY 8 HOURS PRN
Qty: 15 TABLET | Refills: 0 | Status: SHIPPED | OUTPATIENT
Start: 2023-05-19 | End: 2023-05-24

## 2023-05-19 RX ORDER — CYCLOBENZAPRINE HCL 10 MG
10 TABLET ORAL 3 TIMES DAILY PRN
Qty: 15 TABLET | Refills: 0 | Status: SHIPPED | OUTPATIENT
Start: 2023-05-19 | End: 2023-05-24

## 2023-05-19 RX ORDER — PREDNISONE 20 MG/1
40 TABLET ORAL DAILY
Qty: 8 TABLET | Refills: 0 | Status: SHIPPED | OUTPATIENT
Start: 2023-05-19 | End: 2023-05-23

## 2023-05-19 RX ORDER — LIDOCAINE 50 MG/G
1 PATCH TOPICAL EVERY 24 HOURS
Qty: 5 PATCH | Refills: 0 | Status: SHIPPED | OUTPATIENT
Start: 2023-05-19 | End: 2023-05-24

## 2023-05-19 RX ORDER — CYCLOBENZAPRINE HCL 10 MG
10 TABLET ORAL ONCE
Status: COMPLETED | OUTPATIENT
Start: 2023-05-19 | End: 2023-05-19

## 2023-05-19 RX ADMIN — CYCLOBENZAPRINE 10 MG: 10 TABLET, FILM COATED ORAL at 13:30

## 2023-05-19 RX ADMIN — KETOROLAC TROMETHAMINE 15 MG: 15 INJECTION, SOLUTION INTRAMUSCULAR; INTRAVENOUS at 13:30
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
